# Patient Record
Sex: MALE | Race: WHITE | URBAN - METROPOLITAN AREA
[De-identification: names, ages, dates, MRNs, and addresses within clinical notes are randomized per-mention and may not be internally consistent; named-entity substitution may affect disease eponyms.]

---

## 2022-08-02 ENCOUNTER — OFFICE VISIT (OUTPATIENT)
Dept: URBAN - METROPOLITAN AREA CLINIC 27 | Facility: CLINIC | Age: 62
End: 2022-08-02
Payer: COMMERCIAL

## 2022-08-02 DIAGNOSIS — Z96.1 PRESENCE OF INTRAOCULAR LENS: ICD-10-CM

## 2022-08-02 DIAGNOSIS — T85.22XA DISPLACEMENT OF INTRAOCULAR LENS, INITIAL ENCOUNTER: Primary | ICD-10-CM

## 2022-08-02 PROCEDURE — 92004 COMPRE OPH EXAM NEW PT 1/>: CPT | Performed by: OPHTHALMOLOGY

## 2022-08-02 PROCEDURE — 92134 CPTRZ OPH DX IMG PST SGM RTA: CPT | Performed by: OPHTHALMOLOGY

## 2022-08-02 ASSESSMENT — INTRAOCULAR PRESSURE
OS: 10
OD: 14

## 2022-08-02 NOTE — IMPRESSION/PLAN
Impression: Displacement of intraocular lens OS Plan: The patient's decline in [place] is due to a dislocated IOL. The diagnosis and natural history were discussed at length. Surgical treatment is recommended. RBA of surgery vs observation were reviewed at length, patient elects to proceed with surgical intervention. Will have pt see [facility] for surgical planning. 

next available with [facility]

## 2022-08-02 NOTE — IMPRESSION/PLAN
Impression: h/o RD OU
 - s/p PPV OD July 2014 ([facility]) - s/p PPV OS Nov 2014 ([facility]) Plan: Fully attached and stable.

## 2022-08-05 ENCOUNTER — OFFICE VISIT (OUTPATIENT)
Dept: URBAN - METROPOLITAN AREA CLINIC 13 | Facility: CLINIC | Age: 62
End: 2022-08-05
Payer: COMMERCIAL

## 2022-08-05 DIAGNOSIS — H33.8 OTHER RETINAL DETACHMENTS: ICD-10-CM

## 2022-08-05 DIAGNOSIS — H35.371 PUCKERING OF MACULA, RIGHT EYE: ICD-10-CM

## 2022-08-05 DIAGNOSIS — H43.89 OTHER DISORDERS OF VITREOUS BODY: ICD-10-CM

## 2022-08-05 PROCEDURE — 99213 OFFICE O/P EST LOW 20 MIN: CPT | Performed by: OPHTHALMOLOGY

## 2022-08-05 PROCEDURE — 92134 CPTRZ OPH DX IMG PST SGM RTA: CPT | Performed by: OPHTHALMOLOGY

## 2022-08-05 ASSESSMENT — INTRAOCULAR PRESSURE
OD: 11
OS: 7

## 2022-08-05 NOTE — IMPRESSION/PLAN
Impression: Other disorders of vitreous body: H43.89. Plan: Displaced capsular bag and IOL in vitreous OS Please see above

## 2022-09-15 ENCOUNTER — POST-OPERATIVE VISIT (OUTPATIENT)
Dept: URBAN - METROPOLITAN AREA CLINIC 27 | Facility: CLINIC | Age: 62
End: 2022-09-15
Payer: COMMERCIAL

## 2022-09-15 DIAGNOSIS — T85.22XA DISPLACEMENT OF INTRAOCULAR LENS, INITIAL ENCOUNTER: Primary | ICD-10-CM

## 2022-09-15 PROCEDURE — 99024 POSTOP FOLLOW-UP VISIT: CPT | Performed by: OPHTHALMOLOGY

## 2022-09-15 ASSESSMENT — INTRAOCULAR PRESSURE
OS: 14
OD: 18

## 2022-09-15 NOTE — IMPRESSION/PLAN
Impression: S/P 25g PPV/IOL exchange with fixated vs sutured IOL OS - . Displacement of intraocular lens, initial encounter  T85.22XA. Plan: --retina attached --IOL centered and stable
--no s/s infection
--IOP acceptable
--start PF/Oflox QID
--RD/endoph WS discussed
--f/u 1 week PO OS

## 2022-09-21 ENCOUNTER — POST-OPERATIVE VISIT (OUTPATIENT)
Dept: URBAN - METROPOLITAN AREA CLINIC 54 | Facility: CLINIC | Age: 62
End: 2022-09-21
Payer: COMMERCIAL

## 2022-09-21 DIAGNOSIS — T85.22XA DISPLACEMENT OF INTRAOCULAR LENS, INITIAL ENCOUNTER: Primary | ICD-10-CM

## 2022-09-21 PROCEDURE — 99024 POSTOP FOLLOW-UP VISIT: CPT | Performed by: OPHTHALMOLOGY

## 2022-09-21 ASSESSMENT — INTRAOCULAR PRESSURE
OS: 22
OD: 15

## 2022-09-21 NOTE — IMPRESSION/PLAN
Impression: S/P 25g PPV/IOL exchange with fixated vs sutured IOL OS - 7 Days. Displacement of intraocular lens, initial encounter  T85.22XA. Plan: No s/s of RD/infection VA/IOP acceptable Sutured IOL well centered and stable Patient is noticing an increase in discomfort. Ok to increase Steroid frequency and AFTs Post-operative instructions and precautions Reviewed. Call ASAP with changes
--Continue Prednisolone acetate QID
--Discontinue Ocuflox 1 month POS/OCT

## 2022-10-20 ENCOUNTER — POST-OPERATIVE VISIT (OUTPATIENT)
Dept: URBAN - METROPOLITAN AREA CLINIC 41 | Facility: CLINIC | Age: 62
End: 2022-10-20
Payer: COMMERCIAL

## 2022-10-20 DIAGNOSIS — H33.8 OTHER RETINAL DETACHMENTS: Primary | ICD-10-CM

## 2022-10-20 PROCEDURE — 99024 POSTOP FOLLOW-UP VISIT: CPT | Performed by: OPHTHALMOLOGY

## 2022-10-20 ASSESSMENT — INTRAOCULAR PRESSURE
OS: 10
OD: 15

## 2022-10-20 NOTE — IMPRESSION/PLAN
Impression: S/P 25g PPV/IOL exchange sutured IOL OS 09/14/2022 ([facility]) Plan: There is mild AC flare OS. No signs of infection. Retina is attached. No s/s of infection IOP is good at (15) Re-start PF OS qid RTC 1-2 weeks

## 2022-10-31 ENCOUNTER — POST-OPERATIVE VISIT (OUTPATIENT)
Dept: URBAN - METROPOLITAN AREA CLINIC 7 | Facility: CLINIC | Age: 62
End: 2022-10-31
Payer: COMMERCIAL

## 2022-10-31 DIAGNOSIS — T85.22XA DISPLACEMENT OF INTRAOCULAR LENS, INITIAL ENCOUNTER: Primary | ICD-10-CM

## 2022-10-31 PROCEDURE — 99024 POSTOP FOLLOW-UP VISIT: CPT | Performed by: OPHTHALMOLOGY

## 2022-10-31 ASSESSMENT — INTRAOCULAR PRESSURE
OS: 16
OD: 14

## 2022-10-31 NOTE — IMPRESSION/PLAN
Impression: S/P 25g PPV/IOL exchange with fixated vs sutured IOL OS - 47 Days. Displacement of intraocular lens, initial encounter  T85.22XA. OCT OU - no IRF/SRF OS  Plan: No s/s of RD/infection VA/IOP acceptable Sutured IOL stable and centered
ok for MRx Post-operative instructions and precautions Reviewed. Taper PF TID x 1 week, BID x 1 week, daily x 1 week and CSM with daily Call ASAP with changes 3m OCT OU

## 2023-02-04 ENCOUNTER — OFFICE VISIT (OUTPATIENT)
Dept: URBAN - METROPOLITAN AREA CLINIC 7 | Facility: CLINIC | Age: 63
End: 2023-02-04
Payer: COMMERCIAL

## 2023-02-04 DIAGNOSIS — H33.8 OTHER RETINAL DETACHMENTS: ICD-10-CM

## 2023-02-04 DIAGNOSIS — T85.22XA DISPLACEMENT OF INTRAOCULAR LENS, INITIAL ENCOUNTER: Primary | ICD-10-CM

## 2023-02-04 DIAGNOSIS — T85.22XD DISPLACEMENT OF INTRAOCULAR LENS, SUBSEQUENT ENCOUNTER: ICD-10-CM

## 2023-02-04 PROCEDURE — 92014 COMPRE OPH EXAM EST PT 1/>: CPT | Performed by: OPHTHALMOLOGY

## 2023-02-04 ASSESSMENT — INTRAOCULAR PRESSURE
OD: 17
OS: 16

## 2023-02-04 NOTE — IMPRESSION/PLAN
Impression: Displacement of intraocular lens, initial encounter OD Plan: Pt presents emergently c/o decreasing VA OD. The diagnosis and natural history were discussed at length. Surgical treatment is recommended. RBA of surgery vs observation were reviewed at length, patient elects to proceed with surgical intervention.  Pt is already scheduled with [facility] in 2 days, keep that appt for evaluation and surgical planning.

keep next

## 2023-02-04 NOTE — IMPRESSION/PLAN
Impression: Displacement of intraocular lens, subsequent encounter OS
 - s/p PPV/sutured IOL 9/14/22 ([facility]) Plan: Perfectly centered

## 2023-02-06 ENCOUNTER — OFFICE VISIT (OUTPATIENT)
Dept: URBAN - METROPOLITAN AREA CLINIC 7 | Facility: CLINIC | Age: 63
End: 2023-02-06
Payer: COMMERCIAL

## 2023-02-06 DIAGNOSIS — H35.81 RETINAL EDEMA: ICD-10-CM

## 2023-02-06 DIAGNOSIS — T85.22XA DISPLACEMENT OF INTRAOCULAR LENS, INITIAL ENCOUNTER: Primary | ICD-10-CM

## 2023-02-06 DIAGNOSIS — H33.8 OTHER RETINAL DETACHMENTS: ICD-10-CM

## 2023-02-06 DIAGNOSIS — H35.371 PUCKERING OF MACULA, RIGHT EYE: ICD-10-CM

## 2023-02-06 DIAGNOSIS — T85.22XD DISPLACEMENT OF INTRAOCULAR LENS, SUBSEQUENT ENCOUNTER: ICD-10-CM

## 2023-02-06 PROCEDURE — 92134 CPTRZ OPH DX IMG PST SGM RTA: CPT | Performed by: OPHTHALMOLOGY

## 2023-02-06 PROCEDURE — 99212 OFFICE O/P EST SF 10 MIN: CPT | Performed by: OPHTHALMOLOGY

## 2023-02-06 RX ORDER — KETOROLAC TROMETHAMINE 5 MG/ML
0.5 % SOLUTION/ DROPS OPHTHALMIC
Qty: 10 | Refills: 3 | Status: INACTIVE
Start: 2023-02-06 | End: 2023-03-07

## 2023-02-06 RX ORDER — PREDNISOLONE ACETATE 10 MG/ML
1 % SUSPENSION/ DROPS OPHTHALMIC
Qty: 10 | Refills: 3 | Status: INACTIVE
Start: 2023-02-06 | End: 2023-05-06

## 2023-02-06 ASSESSMENT — INTRAOCULAR PRESSURE
OD: 14
OS: 13

## 2023-02-06 NOTE — IMPRESSION/PLAN
Impression: Displacement of intraocular lens, initial encounter OD
OCT OU = no view OD mild IRF OS CST  -  / 452 Plan: We discussed the findings of the patient's dislocated IOL. Surgical treatment is recommended. Surgical risks, benefits, indications and alternatives were discussed. These risks include, but are not limited to, infection, retinal tear/detachment, dislocation of the secondary lens, glaucoma, pain, loss of eye and blindness, hemorrhage, ptosis, and the need for re-operation. The patient understands that with vitrectomy, the vision can improve, but does not improve fully and sometimes the vision does not improve at all with ERM peel. Also, it can take months to years for the vision to improve. The risks of vitrectomy include but are not limited to infection, retinal tear or detachment, glaucoma, cataract formation in a phakic patient, hemorrhage, loss of eye and blindness, recurrent epiretinal membranes, need for additional surgery, and chronic cystoid macular edema. The patient elects to proceed with vitrectomy surgery/ERM peel/sutured lens vs ACIOL. PPV/MP/ILM peel/ICG/IVK/IOL exchange with sutured IOL vs ACIOL OD x dislocatled IOL/ERM OD

## 2023-02-06 NOTE — IMPRESSION/PLAN
Impression: Puckering of macula, right eye: H35.371. Plan: H/o ERM OD
RBA's of ERM peel at time of PPV/IOL exchange d/w patient in detail He elects to proceed with ERM peel.

## 2023-02-06 NOTE — IMPRESSION/PLAN
Impression: Displacement of intraocular lens, subsequent encounter OS
 - s/p PPV/sutured IOL 9/14/22 ([facility]) Plan: Stable and centered

## 2023-03-30 ENCOUNTER — POST-OPERATIVE VISIT (OUTPATIENT)
Dept: URBAN - METROPOLITAN AREA CLINIC 27 | Facility: CLINIC | Age: 63
End: 2023-03-30
Payer: COMMERCIAL

## 2023-03-30 DIAGNOSIS — T85.22XA DISPLACEMENT OF INTRAOCULAR LENS, INITIAL ENCOUNTER: ICD-10-CM

## 2023-03-30 DIAGNOSIS — T85.22XD DISPLACEMENT OF INTRAOCULAR LENS, SUBSEQUENT ENCOUNTER: Primary | ICD-10-CM

## 2023-03-30 PROCEDURE — 99024 POSTOP FOLLOW-UP VISIT: CPT | Performed by: OPHTHALMOLOGY

## 2023-03-30 ASSESSMENT — INTRAOCULAR PRESSURE
OS: 19
OD: 9

## 2023-03-30 NOTE — IMPRESSION/PLAN
Impression: S/P PPV/MP/ILM/ICG/IVK/IOL EXCHANGE with SUTURED IOL vs ACIOL OD - 1 Day. Displacement of intraocular lens, subsequent encounter  T85.22XD. Plan: --Excellent post op course   
--Post operative instructions reviewed 
--Condition is improving RTC: 1 wk POS OD

## 2023-04-03 ENCOUNTER — POST-OPERATIVE VISIT (OUTPATIENT)
Dept: URBAN - METROPOLITAN AREA CLINIC 7 | Facility: CLINIC | Age: 63
End: 2023-04-03
Payer: COMMERCIAL

## 2023-04-03 PROCEDURE — 99024 POSTOP FOLLOW-UP VISIT: CPT | Performed by: OPHTHALMOLOGY

## 2023-04-03 RX ORDER — ATROPINE SULFATE 10 MG/ML
1 % SOLUTION/ DROPS OPHTHALMIC
Qty: 10 | Refills: 0 | Status: INACTIVE
Start: 2023-04-03 | End: 2023-05-02

## 2023-04-03 ASSESSMENT — INTRAOCULAR PRESSURE: OS: 15

## 2023-04-03 NOTE — IMPRESSION/PLAN
Impression: S/P PPV/MP/ILM/ICG/IVK/IOL EXCHANGE with SUTURED IOL vs ACIOL OD - 5 Days. Displacement of intraocular lens, initial encounter  T85.22XA. Plan: No s/s of RD/infection Sutured IOL well centered and stable Does have VH and some RBC's in the [facility] - no hyphema AC formed and no leak Rec increase PF to [facility], start atropine BID Rec closer f/u Post-operative instructions and precautions Reviewed. Call ASAP with changes
--Continue Prednisolone acetate QID
--Discontinue Ocuflox 1 week POS/OCT

## 2023-04-05 ENCOUNTER — POST-OPERATIVE VISIT (OUTPATIENT)
Facility: LOCATION | Age: 63
End: 2023-04-05
Payer: COMMERCIAL

## 2023-04-05 DIAGNOSIS — T85.22XA DISPLACEMENT OF INTRAOCULAR LENS, INITIAL ENCOUNTER: Primary | ICD-10-CM

## 2023-04-05 PROCEDURE — 99024 POSTOP FOLLOW-UP VISIT: CPT | Performed by: OPHTHALMOLOGY

## 2023-04-05 RX ORDER — HYDROCODONE BITARTRATE AND ACETAMINOPHEN 10; 325 MG/1; MG/1
TABLET ORAL
Qty: 20 | Refills: 0 | Status: ACTIVE
Start: 2023-04-05

## 2023-04-05 RX ORDER — PREDNISONE 20 MG/1
20 MG TABLET ORAL
Qty: 60 | Refills: 0 | Status: INACTIVE
Start: 2023-04-05 | End: 2023-04-05

## 2023-04-05 ASSESSMENT — INTRAOCULAR PRESSURE
OS: 20
OS: 15

## 2023-04-05 NOTE — IMPRESSION/PLAN
Impression: S/P PPV/MP/ILM/ICG/IVK/IOL EXCHANGE with SUTURED IOL vs ACIOL OD - 7 Days. Displacement of intraocular lens, initial encounter  T85.22XA. Plan: Patient presents with significant pain . IOP is still soft. Unfortunately, no B scan at the location to assess for choroidal hemorrhage. AC is well formed. Will start prednisone 40 mg PO daily. 

RTC 1 day, POS with B scan

## 2023-04-06 ENCOUNTER — POST-OPERATIVE VISIT (OUTPATIENT)
Dept: URBAN - NONMETROPOLITAN AREA CLINIC 5 | Facility: CLINIC | Age: 63
End: 2023-04-06
Payer: COMMERCIAL

## 2023-04-06 PROCEDURE — 99024 POSTOP FOLLOW-UP VISIT: CPT | Performed by: OPHTHALMOLOGY

## 2023-04-06 RX ORDER — ACETAZOLAMIDE 250 MG/1
250 MG TABLET ORAL
Qty: 120 | Refills: 0 | Status: INACTIVE
Start: 2023-04-06 | End: 2023-05-05

## 2023-04-06 RX ORDER — BRIMONIDINE TARTRATE AND TIMOLOL MALEATE 2; 5 MG/ML; MG/ML
SOLUTION/ DROPS OPHTHALMIC
Qty: 10 | Refills: 3 | Status: INACTIVE
Start: 2023-04-06 | End: 2023-05-05

## 2023-04-06 ASSESSMENT — INTRAOCULAR PRESSURE
OS: 14
OD: 38

## 2023-04-06 NOTE — IMPRESSION/PLAN
Impression: S/P PPV/MP/ILM/ICG/IVK/IOL EXCHANGE with SUTURED IOL vs ACIOL OD - 8 Days. Displacement of intraocular lens, initial encounter  T85.22XA. Plan: No s/s of RD/infection

hyphema resolved
no infection/inflammation
pain resolved on vicodin and prednisone - UGH? - rec obs for now IOP today elevated - difficulty with IOP check yesterday due to pt movement; per pt, he did poke himself in the eye POD 2 or 3 - maybe causing hypotony and hyphema? IOP recheck = 36/37 OD and 17 OS Sutured IOL well centered and stable Post-operative instructions and precautions Reviewed. Call ASAP with changes
--Continue Prednisolone acetate QID
--start combigan BID and diamox 500 PO BID
--Discontinue Ocuflox Cont prednisone 40mg PO daily x one week, 20mg PO daily x one week, then 10mg PO daily x one week, as per prior plan


keep appt for tomorrow

## 2023-04-07 ENCOUNTER — POST-OPERATIVE VISIT (OUTPATIENT)
Dept: URBAN - METROPOLITAN AREA CLINIC 54 | Facility: CLINIC | Age: 63
End: 2023-04-07
Payer: COMMERCIAL

## 2023-04-07 PROCEDURE — 99024 POSTOP FOLLOW-UP VISIT: CPT | Performed by: OPHTHALMOLOGY

## 2023-04-07 ASSESSMENT — INTRAOCULAR PRESSURE
OS: 13
OD: 16

## 2023-04-07 NOTE — IMPRESSION/PLAN
Impression: S/P PPV/MP/ILM/ICG/IVK/IOL EXCHANGE with SUTURED IOL OD - 9 Days. Displacement of intraocular lens, initial encounter  T85.22XA. Plan: No s/s of RD/no infection/inflammation
pain resolved on vicodin and prednisone - UGH? - rec obs for now IOP improved, heme slowly resolving Sutured IOL well centered and stable Post-operative instructions and precautions Reviewed. Call ASAP with changes --currently on PF Q2h, combigan BID and diamox 500 PO BID; also on oral prednisone 40mg PO
did not take the combigan this morning

taper oral pred to 20mg PO x 3 days, then 10mg x 3 days, then d/c
taper PF to QID
CSM with diamox 500 PO BID - when done with diamox, switch to combigan BID
CSM with atroine BID

1-2 weeks PO OD

## 2023-04-17 ENCOUNTER — POST-OPERATIVE VISIT (OUTPATIENT)
Dept: URBAN - METROPOLITAN AREA CLINIC 13 | Facility: CLINIC | Age: 63
End: 2023-04-17
Payer: COMMERCIAL

## 2023-04-17 DIAGNOSIS — T85.22XA DISPLACEMENT OF INTRAOCULAR LENS, INITIAL ENCOUNTER: Primary | ICD-10-CM

## 2023-04-17 PROCEDURE — 99024 POSTOP FOLLOW-UP VISIT: CPT | Performed by: OPHTHALMOLOGY

## 2023-04-17 ASSESSMENT — INTRAOCULAR PRESSURE
OS: 19
OD: 31

## 2023-04-17 NOTE — IMPRESSION/PLAN
Impression: S/P PPV/MP/ILM/ICG/IVK/IOL EXCHANGE with SUTURED IOL vs ACIOL OD - 19 Days. Displacement of intraocular lens, initial encounter  T85.22XA. Plan: No s/s of RD/infection VA acceptable Sutured IOL well centered and stable IOP elevated - currently only using Atropine BID He has stopped using all other drops/pills Rec start PF QID and combigan BID Post-operative instructions and precautions Reviewed. Call ASAP with changes
--Continue Prednisolone acetate QID
--Discontinue Ocuflox 1 month POS/OCT

## 2023-05-15 ENCOUNTER — POST-OPERATIVE VISIT (OUTPATIENT)
Dept: URBAN - METROPOLITAN AREA CLINIC 13 | Facility: CLINIC | Age: 63
End: 2023-05-15
Payer: COMMERCIAL

## 2023-05-15 DIAGNOSIS — T85.22XA DISPLACEMENT OF INTRAOCULAR LENS, INITIAL ENCOUNTER: Primary | ICD-10-CM

## 2023-05-15 PROCEDURE — 99024 POSTOP FOLLOW-UP VISIT: CPT | Performed by: OPHTHALMOLOGY

## 2023-05-15 ASSESSMENT — INTRAOCULAR PRESSURE
OS: 10
OD: 7

## 2023-05-15 NOTE — IMPRESSION/PLAN
Impression: S/P PPV/MP/ILM/ICG/IVK/IOL EXCHANGE with SUTURED IOL vs ACIOL OD - 47 Days. Displacement of intraocular lens, initial encounter  T85.22XA. OCT OU - IRF OU  / 461 Plan: No s/s of RD/infection VA/IOP acceptable Sutured IOL stable and centered Post-operative instructions and precautions Reviewed. Rec continue PF QID OD and starting PF QID OS for CME Rec d/c Atropine. Continue with Cosopt BID Call ASAP with changes 1m OCT OU

## 2023-06-20 ENCOUNTER — OFFICE VISIT (OUTPATIENT)
Dept: URBAN - METROPOLITAN AREA CLINIC 13 | Facility: CLINIC | Age: 63
End: 2023-06-20
Payer: COMMERCIAL

## 2023-06-20 DIAGNOSIS — T85.22XA DISPLACEMENT OF INTRAOCULAR LENS, INITIAL ENCOUNTER: Primary | ICD-10-CM

## 2023-06-20 DIAGNOSIS — H35.371 PUCKERING OF MACULA, RIGHT EYE: ICD-10-CM

## 2023-06-20 DIAGNOSIS — H35.81 RETINAL EDEMA: ICD-10-CM

## 2023-06-20 DIAGNOSIS — T85.22XD DISPLACEMENT OF INTRAOCULAR LENS, SUBSEQUENT ENCOUNTER: ICD-10-CM

## 2023-06-20 DIAGNOSIS — H33.8 OTHER RETINAL DETACHMENTS: ICD-10-CM

## 2023-06-20 PROCEDURE — 92134 CPTRZ OPH DX IMG PST SGM RTA: CPT | Performed by: OPHTHALMOLOGY

## 2023-06-20 PROCEDURE — 99214 OFFICE O/P EST MOD 30 MIN: CPT | Performed by: OPHTHALMOLOGY

## 2023-06-20 RX ORDER — PREDNISOLONE ACETATE 10 MG/ML
1 % SUSPENSION/ DROPS OPHTHALMIC
Qty: 10 | Refills: 3 | Status: INACTIVE
Start: 2023-06-20 | End: 2023-07-19

## 2023-06-20 ASSESSMENT — INTRAOCULAR PRESSURE
OD: 14
OS: 12

## 2023-06-20 NOTE — IMPRESSION/PLAN
Impression: Retinal edema: H35.81. OU
OCT OU = IRF OU /421 Plan: OD: stable IRF
OS: persistent IRF Taking PF BID-TID OD Rec PF/Acular QID OU

2m OCT OU re-eval STK OU

## 2023-06-20 NOTE — IMPRESSION/PLAN
Impression:  Displacement of intraocular lens, initial encounter  T85.22XA. S/P PPV/MP/ILM/ICG/IVK/IOL EXCHANGE with SUTURED IOL OD (03/29/2023) Plan: Stable OU
still with some CME OU
please see CME diagnosis

## 2024-03-18 ENCOUNTER — OFFICE VISIT (OUTPATIENT)
Dept: URBAN - METROPOLITAN AREA CLINIC 7 | Facility: CLINIC | Age: 64
End: 2024-03-18
Payer: COMMERCIAL

## 2024-03-18 DIAGNOSIS — H35.371 PUCKERING OF MACULA, RIGHT EYE: ICD-10-CM

## 2024-03-18 DIAGNOSIS — T85.22XA DISPLACEMENT OF INTRAOCULAR LENS, INITIAL ENCOUNTER: ICD-10-CM

## 2024-03-18 DIAGNOSIS — H35.81 RETINAL EDEMA: Primary | ICD-10-CM

## 2024-03-18 DIAGNOSIS — H33.8 OTHER RETINAL DETACHMENTS: ICD-10-CM

## 2024-03-18 DIAGNOSIS — T85.22XD DISPLACEMENT OF INTRAOCULAR LENS, SUBSEQUENT ENCOUNTER: ICD-10-CM

## 2024-03-18 PROCEDURE — 92134 CPTRZ OPH DX IMG PST SGM RTA: CPT | Performed by: OPHTHALMOLOGY

## 2024-03-18 PROCEDURE — 92014 COMPRE OPH EXAM EST PT 1/>: CPT | Performed by: OPHTHALMOLOGY

## 2024-03-18 RX ORDER — KETOROLAC TROMETHAMINE 5 MG/ML
0.5 % SOLUTION OPHTHALMIC
Qty: 5 | Refills: 3 | Status: ACTIVE
Start: 2024-03-18

## 2024-03-18 RX ORDER — PREDNISOLONE ACETATE 10 MG/ML
1 % SUSPENSION/ DROPS OPHTHALMIC
Qty: 5 | Refills: 3 | Status: ACTIVE
Start: 2024-03-18

## 2024-03-18 ASSESSMENT — INTRAOCULAR PRESSURE
OD: 11
OS: 12

## 2025-06-10 ENCOUNTER — OFFICE VISIT (OUTPATIENT)
Dept: URBAN - METROPOLITAN AREA CLINIC 27 | Facility: CLINIC | Age: 65
End: 2025-06-10
Payer: COMMERCIAL

## 2025-06-10 DIAGNOSIS — H35.81 RETINAL EDEMA: Primary | ICD-10-CM

## 2025-06-10 DIAGNOSIS — T85.22XD DISPLACEMENT OF INTRAOCULAR LENS, SUBSEQUENT ENCOUNTER: ICD-10-CM

## 2025-06-10 DIAGNOSIS — H33.8 OTHER RETINAL DETACHMENTS: ICD-10-CM

## 2025-06-10 DIAGNOSIS — H40.9 GLAUCOMA: ICD-10-CM

## 2025-06-10 DIAGNOSIS — H35.371 PUCKERING OF MACULA, RIGHT EYE: ICD-10-CM

## 2025-06-10 PROCEDURE — 92014 COMPRE OPH EXAM EST PT 1/>: CPT | Performed by: OPHTHALMOLOGY

## 2025-06-10 PROCEDURE — 92134 CPTRZ OPH DX IMG PST SGM RTA: CPT | Performed by: OPHTHALMOLOGY

## 2025-06-10 ASSESSMENT — INTRAOCULAR PRESSURE
OS: 18
OD: 16